# Patient Record
Sex: FEMALE | Race: OTHER
[De-identification: names, ages, dates, MRNs, and addresses within clinical notes are randomized per-mention and may not be internally consistent; named-entity substitution may affect disease eponyms.]

---

## 2019-07-13 ENCOUNTER — HOSPITAL ENCOUNTER (EMERGENCY)
Dept: HOSPITAL 72 - EMR | Age: 39
Discharge: HOME | End: 2019-07-13
Payer: MEDICAID

## 2019-07-13 VITALS — SYSTOLIC BLOOD PRESSURE: 120 MMHG | DIASTOLIC BLOOD PRESSURE: 87 MMHG

## 2019-07-13 VITALS — BODY MASS INDEX: 38.98 KG/M2 | WEIGHT: 220 LBS | HEIGHT: 63 IN

## 2019-07-13 DIAGNOSIS — H00.14: Primary | ICD-10-CM

## 2019-07-13 DIAGNOSIS — H10.9: ICD-10-CM

## 2019-07-13 PROCEDURE — 99282 EMERGENCY DEPT VISIT SF MDM: CPT

## 2019-07-13 NOTE — EMERGENCY ROOM REPORT
History of Present Illness


General


Chief Complaint:  Eye Problems


Source:  Patient


 (Zamzam Madrigal)





Present Illness


HPI


39-year-old female with no significant past medical history here complaining of 

few days of pain and discharge from left eye.  Patient reports that she keeps 

using the same mascara and always wears eye make-up.  Denies any injury to the 

eye or does not remember whether there was a foreign body exposure left eye.  

Denies burning sensation in the eye, photophobia, blurry vision.  Has not taken 

any medication or any eyedrops.  Denies chest pain, shortness of breath, 

palpitation, URI symptoms, dizziness, vertigo, headache, and other associated 

symptoms.


 (Zamzam Madrigal)


Allergies:  


Coded Allergies:  


     No Known Allergies (Unverified , 7/13/19)





Patient History


Past Medical History:  see triage record


Past Surgical History:  unable to obtain


Pertinent Family History:  none


Last Menstrual Period:  2 years ago; Depo shot


Pregnant Now:  No


Immunizations:  UTD


Reviewed Nursing Documentation:  PMH: Agreed; PSxH: Agreed (Zamzam Madrigal)





Nursing Documentation-PMH


Past Medical History:  No Stated History


 (Zamzam Madrigal)





Review of Systems


All Other Systems:  negative except mentioned in HPI


 (Zamzam Madrigal)





Physical Exam





Vital Signs








  Date Time  Temp Pulse Resp B/P (MAP) Pulse Ox O2 Delivery O2 Flow Rate FiO2


 


7/13/19 16:22 98.1 67 16 120/87 (98) 97 Room Air  








Sp02 EP Interpretation:  reviewed, normal


General Appearance:  normal inspection, well appearing, no apparent distress, 

alert, GCS 15


Head:  normocephalic, atraumatic


Eyes:  right eye other - chalazion in left eye and the conjunctive are injected

; bilateral eye normal inspection, bilateral eye PERRL


ENT:  normal ENT inspection, hearing grossly normal, normal pharynx


Neck:  normal inspection, full range of motion, supple, no meningismus


Respiratory:  normal inspection, chest non-tender, lungs clear, no retraction, 

no wheezing


Cardiovascular #1:  normal inspection, normal peripheral pulses, regular rate, 

rhythm, no murmur


Gastrointestinal:  non tender, soft, no guarding


Genitourinary:  no CVA tenderness


Neurologic:  normal inspection, alert, oriented x3


Psychiatric:  normal inspection, judgement/insight normal, memory normal


Skin:  no rash, palpation normal


Lymphatic:  normal inspection, no adenopathy


 (Zamzam Madrigal)





Medical Decision Making


PA Attestation


Diagnosis and treatment plans were reviewed and discussed with my supervising 

physician Dr. Kelly


 (Zamzam Madrigal)


Medicare Attestation


I discussed the care with Zamzam KAUFFMAN on 7/13/2019. I agree with 

the findings and plan as documented in the note.


 (Darci Kelly M.D.)


Diagnostic Impression:  


 Primary Impression:  


 Chalazion left upper eyelid


 Additional Impression:  


 Conjunctivitis


ER Course


39-year-old female with no significant past medical history here complaining of 

few days of pain and discharge from left eye.  Patient reports that she keeps 

using the same mascara and always wears eye make-up.  Denies any injury to the 

eye or does not remember whether there was a foreign body exposure left eye.  

Denies burning sensation in the eye, photophobia, blurry vision.  Has not taken 

any medication or any eyedrops.  Denies chest pain, shortness of breath, 

palpitation, URI symptoms, dizziness, vertigo, headache, and other associated 

symptoms.





Ddx considered but are not limited to: bacterial conjunctivitis, allergic 

conjunctivitis, viral conjunctivitis, periorbital cellulitis, global trauma 














Vital signs: are WNL, pt. is afebrile








 H&PE are most consistent with: Conjunctivitis, chalazion











ORDERS: Ofloxacin eyedrops, erythromycin ophthalmic ointment











ED INTERVENTIONS: None required at this time.























DISCHARGE: At this time pt. is stable for d/c to home. Will provide printed 

patient care instructions, and any necessary prescriptions. Care plan and 

follow up instructions have been discussed with the patient prior to discharge.


 (Zamzam Madrigal)





Last Vital Signs








  Date Time  Temp Pulse Resp B/P (MAP) Pulse Ox O2 Delivery O2 Flow Rate FiO2


 


7/13/19 16:22 98.1  16 120/87 97 Room Air  


 


7/13/19 16:22  67      








 (Zamzam Madrigal)


Disposition:  HOME, SELF-CARE


Condition:  Stable


Scripts


Erythromycin Base (Erythromycin) 1 Gm Oint...g.


1 GM OP TID, #1 GM


   Prov: Zamzam Madrigal         7/13/19 


Polymyxin/Trimethoprim (Polytrim Eye Drops) 10 Ml Drops


1 DROP LEFT EYE Q4H, #10 ML


   Prov: Zamzam Madrigal         7/13/19


Patient Instructions:  Bacterial Conjunctivitis, Easy-to-Read, Chalazion





Additional Instructions:  


See primary care provider for follow-up avoid wearing eye make-up change her 

mascara change pillowcase











Zamzam Madrigal Jul 13, 2019 16:40


Darci Kelly M.D. Jul 14, 2019 11:28